# Patient Record
Sex: FEMALE | Race: WHITE
[De-identification: names, ages, dates, MRNs, and addresses within clinical notes are randomized per-mention and may not be internally consistent; named-entity substitution may affect disease eponyms.]

---

## 2019-04-14 ENCOUNTER — HOSPITAL ENCOUNTER (EMERGENCY)
Dept: HOSPITAL 26 - MED | Age: 29
Discharge: HOME | End: 2019-04-14
Payer: MEDICAID

## 2019-04-14 VITALS — DIASTOLIC BLOOD PRESSURE: 70 MMHG | SYSTOLIC BLOOD PRESSURE: 108 MMHG

## 2019-04-14 VITALS — DIASTOLIC BLOOD PRESSURE: 73 MMHG | SYSTOLIC BLOOD PRESSURE: 106 MMHG

## 2019-04-14 VITALS — WEIGHT: 190 LBS | BODY MASS INDEX: 31.65 KG/M2 | HEIGHT: 65 IN

## 2019-04-14 DIAGNOSIS — J45.909: ICD-10-CM

## 2019-04-14 DIAGNOSIS — Z3A.10: ICD-10-CM

## 2019-04-14 DIAGNOSIS — J02.9: ICD-10-CM

## 2019-04-14 DIAGNOSIS — O99.511: Primary | ICD-10-CM

## 2019-04-14 PROCEDURE — 94640 AIRWAY INHALATION TREATMENT: CPT

## 2019-04-14 PROCEDURE — 87804 INFLUENZA ASSAY W/OPTIC: CPT

## 2019-04-14 PROCEDURE — 99283 EMERGENCY DEPT VISIT LOW MDM: CPT

## 2019-04-14 NOTE — NUR
Patient discharged with v/s stable. Written and verbal after care instructions 
given and explained. 

Patient alert, oriented and verbalized understanding of instructions. 
Ambulatory with steady gait. All questions addressed prior to discharge. ID 
band removed. Patient advised to follow up with PMD. Rx of Albuterol, 
Promethazine given. Patient educated on indication of medication including 
possible reaction and side effects. Opportunity to ask questions provided and 
answered.

## 2019-04-14 NOTE — NUR
BIB SELF. AAOX4 C/O FEVER, COUGH WITH YELLOW SPUTUM, SORETHROAT X 3 DAYS. 
UNABLE TO TAKE OTC MEDS D/T FEAR OF CONTRAINDICATION WITH HER PREGNANCY. PT 
STATES SORETHROAT 10/10. MILD REDNESS ON TONSILS UPON INSPECTION. EQUAL MYLES 
LUNGS CLEAR UPON AUSCULTATION. DENIES SOB. HOB UP. BED SIDE RAILS UP X1. ON LOW 
BED POSITION, LOCKED. MD ER MADE AWARE OF PT STATUS.